# Patient Record
Sex: MALE | Race: WHITE | Employment: UNEMPLOYED | URBAN - NONMETROPOLITAN AREA
[De-identification: names, ages, dates, MRNs, and addresses within clinical notes are randomized per-mention and may not be internally consistent; named-entity substitution may affect disease eponyms.]

---

## 2021-04-14 ENCOUNTER — OFFICE VISIT (OUTPATIENT)
Dept: PRIMARY CARE CLINIC | Age: 21
End: 2021-04-14

## 2021-04-14 ENCOUNTER — HOSPITAL ENCOUNTER (OUTPATIENT)
Age: 21
Setting detail: SPECIMEN
Discharge: HOME | End: 2021-04-14

## 2021-04-14 VITALS
HEART RATE: 124 BPM | RESPIRATION RATE: 16 BRPM | SYSTOLIC BLOOD PRESSURE: 105 MMHG | WEIGHT: 239.4 LBS | TEMPERATURE: 99.3 F | DIASTOLIC BLOOD PRESSURE: 79 MMHG | HEIGHT: 71 IN | BODY MASS INDEX: 33.52 KG/M2 | OXYGEN SATURATION: 95 %

## 2021-04-14 DIAGNOSIS — J06.9 UPPER RESPIRATORY TRACT INFECTION, UNSPECIFIED TYPE: Primary | ICD-10-CM

## 2021-04-14 DIAGNOSIS — Z20.822 PERSON UNDER INVESTIGATION FOR COVID-19: ICD-10-CM

## 2021-04-14 DIAGNOSIS — J06.9 UPPER RESPIRATORY TRACT INFECTION, UNSPECIFIED TYPE: ICD-10-CM

## 2021-04-14 PROCEDURE — 99213 OFFICE O/P EST LOW 20 MIN: CPT | Performed by: NURSE PRACTITIONER

## 2021-04-14 PROCEDURE — U0005 INFEC AGEN DETEC AMPLI PROBE: HCPCS

## 2021-04-14 PROCEDURE — U0003 INFECTIOUS AGENT DETECTION BY NUCLEIC ACID (DNA OR RNA); SEVERE ACUTE RESPIRATORY SYNDROME CORONAVIRUS 2 (SARS-COV-2) (CORONAVIRUS DISEASE [COVID-19]), AMPLIFIED PROBE TECHNIQUE, MAKING USE OF HIGH THROUGHPUT TECHNOLOGIES AS DESCRIBED BY CMS-2020-01-R: HCPCS

## 2021-04-14 RX ORDER — POTASSIUM CHLORIDE 20 MEQ/1
20 TABLET, EXTENDED RELEASE ORAL DAILY
COMMUNITY
Start: 2021-01-22

## 2021-04-14 RX ORDER — OMEPRAZOLE 20 MG/1
20 CAPSULE, DELAYED RELEASE ORAL DAILY
COMMUNITY
Start: 2021-04-08 | End: 2021-05-08

## 2021-04-14 RX ORDER — PROCHLORPERAZINE MALEATE 10 MG
10 TABLET ORAL EVERY 6 HOURS PRN
COMMUNITY
Start: 2021-03-02 | End: 2022-03-02

## 2021-04-14 RX ORDER — OXYCODONE HYDROCHLORIDE 10 MG/1
10 TABLET ORAL EVERY 4 HOURS PRN
COMMUNITY
Start: 2021-04-08 | End: 2021-05-08

## 2021-04-14 SDOH — HEALTH STABILITY: MENTAL HEALTH: HOW OFTEN DO YOU HAVE A DRINK CONTAINING ALCOHOL?: NEVER

## 2021-04-14 ASSESSMENT — ENCOUNTER SYMPTOMS
COUGH: 1
CHEST TIGHTNESS: 0
SORE THROAT: 1
RHINORRHEA: 1
SHORTNESS OF BREATH: 0
GASTROINTESTINAL NEGATIVE: 1
WHEEZING: 0

## 2021-04-14 ASSESSMENT — PATIENT HEALTH QUESTIONNAIRE - PHQ9
1. LITTLE INTEREST OR PLEASURE IN DOING THINGS: 0
2. FEELING DOWN, DEPRESSED OR HOPELESS: 0
SUM OF ALL RESPONSES TO PHQ QUESTIONS 1-9: 0
SUM OF ALL RESPONSES TO PHQ QUESTIONS 1-9: 0

## 2021-04-14 NOTE — PROGRESS NOTES
04/14/21 1652   BP: 105/79   Site: Right Upper Arm   Position: Sitting   Cuff Size: Large Adult   Pulse: 124   Resp: 16   Temp: 99.3 °F (37.4 °C)   TempSrc: Temporal   SpO2: 95%   Weight: 239 lb 6.4 oz (108.6 kg)   Height: 5' 11\" (1.803 m)     Body mass index is 33.39 kg/m². Review of Systems   Constitutional: Negative. Negative for appetite change, chills, fatigue and fever. HENT: Positive for congestion, postnasal drip, rhinorrhea and sore throat. Negative for ear pain. Respiratory: Positive for cough. Negative for chest tightness, shortness of breath and wheezing. Cardiovascular: Negative. Negative for chest pain. Gastrointestinal: Negative. Musculoskeletal: Negative for myalgias. Skin: Negative for rash. Allergic/Immunologic: Positive for environmental allergies. Neurological: Negative for headaches. Physical Exam  Vitals signs and nursing note reviewed. Constitutional:       Appearance: He is well-developed. HENT:      Head: Normocephalic. Jaw: There is normal jaw occlusion. Right Ear: Tympanic membrane, ear canal and external ear normal.      Left Ear: Tympanic membrane, ear canal and external ear normal.      Nose: Congestion and rhinorrhea present. Rhinorrhea is clear. Right Turbinates: Swollen (erythema). Left Turbinates: Swollen (erythema). Right Sinus: No maxillary sinus tenderness or frontal sinus tenderness. Left Sinus: No maxillary sinus tenderness or frontal sinus tenderness. Mouth/Throat:      Lips: Pink. Mouth: Mucous membranes are moist.      Pharynx: Oropharynx is clear. Uvula midline. Posterior oropharyngeal erythema present. Tonsils: 1+ on the right. 1+ on the left. Eyes:      Pupils: Pupils are equal, round, and reactive to light. Neck:      Musculoskeletal: Normal range of motion and neck supple. Cardiovascular:      Rate and Rhythm: Normal rate and regular rhythm. Heart sounds: Normal heart sounds. Pulmonary:      Effort: Pulmonary effort is normal.      Breath sounds: Normal breath sounds and air entry. Lymphadenopathy:      Cervical: No cervical adenopathy. Skin:     General: Skin is warm and dry. Neurological:      General: No focal deficit present. Mental Status: He is alert and oriented to person, place, and time. Psychiatric:         Behavior: Behavior normal.         Thought Content: Thought content normal.       Assessment and Plan:    No results found for this visit on 04/14/21. Diagnosis Orders   1. Upper respiratory tract infection, unspecified type  COVID-19   2. Person under investigation for COVID-19  COVID-19       Self quarantine until negative Covid-19 test result received and symptoms improving. We will call with Covid-19 test results. I recommended that he use mucinex to help with congestion and cough. I also recommended Flonase and an antihistamine for sinus symptoms. he was also encouraged to use tylenol or ibuprofen for pain/fever. Increase water intake. Use cool mist humidifier at bedtime. Use nasal saline flush as needed. Good hand hygiene. he was instructed to return if there is no improvement or symptoms worsen. The use, risks, benefits, and side effects of prescribed or recommended medications were discussed. All questions were answered and the patient/caregiver voiced understanding. No orders of the defined types were placed in this encounter.         Electronically signed by RUIZ Perez CNP on 4/14/21 at 5:28 PM EDT

## 2021-04-14 NOTE — PATIENT INSTRUCTIONS
ventilator. Some people may be placed on their belly to help their oxygen levels. Treatments that may help people who have COVID-19 include:  Antiviral medicines. These medicines treat viral infections. Remdesivir is an example. Immune-based therapy. These medicines help the immune system fight COVID-19. One example is bamlanivimab. It's a monoclonal antibody. Blood thinners. These medicines help prevent blood clots. People with severe illness are at risk for blood clots. How can you protect yourself and others? The best way to protect yourself from getting sick is to:  · Avoid areas where there is an outbreak. · Avoid contact with people who may be infected. · Avoid crowds and try to stay at least 6 feet away from other people. · Wash your hands often, especially after you cough or sneeze. Use soap and water, and scrub for at least 20 seconds. If soap and water aren't available, use an alcohol-based hand . · Avoid touching your mouth, nose, and eyes. To help avoid spreading the virus to others:  · Stay home if you are sick or have been exposed to the virus. Don't go to school, work, or public areas. And don't use public transportation, ride-shares, or taxis unless you have no choice. · Wear a cloth face cover if you have to go to public areas. · Cover your mouth with a tissue when you cough or sneeze. Then throw the tissue in the trash and wash your hands right away. · If you're sick:  ? Leave your home only if you need to get medical care. But call the doctor's office first so they know you're coming. And wear a face cover. ? Wear the face cover whenever you're around other people. It can help stop the spread of the virus. ? Limit contact with pets and people in your home. If possible, stay in a separate bedroom and use a separate bathroom. ? Clean and disinfect your home every day. Use household  and disinfectant wipes or sprays.  Take special care to clean things that you grab with your hands. These include doorknobs, remote controls, phones, and handles on your refrigerator and microwave. And don't forget countertops, tabletops, bathrooms, and computer keyboards. When should you call for help? Call 911 anytime you think you may need emergency care. For example, call if you have life-threatening symptoms, such as:    · You have severe trouble breathing. (You can't talk at all.)     · You have constant chest pain or pressure.     · You are severely dizzy or lightheaded.     · You are confused or can't think clearly.     · Your face and lips have a blue color.     · You pass out (lose consciousness) or are very hard to wake up. Call your doctor now or seek immediate medical care if:    · You have moderate trouble breathing. (You can't speak a full sentence.)     · You are coughing up blood (more than about 1 teaspoon).     · You have signs of low blood pressure. These include feeling lightheaded; being too weak to stand; and having cold, pale, clammy skin. Watch closely for changes in your health, and be sure to contact your doctor if:    · Your symptoms get worse.     · You are not getting better as expected. Call before you go to the doctor's office. Follow their instructions. And wear a cloth face cover. Current as of: February 19, 2021               Content Version: 12.8  © 2006-2021 [employer]. Care instructions adapted under license by [employer]. If you have questions about a medical condition or this instruction, always ask your healthcare professional. [place] [address] any warranty or liability for your use of this information. Patient Education        Coronavirus (FQSXM-91): Care Instructions  Overview  The coronavirus disease (COVID-19) is caused by a virus. Symptoms may include a fever, a cough, and shortness of breath. It mainly spreads person-to-person through droplets from coughing and sneezing.  The virus also can spread when people are in close contact with someone who is infected. Most people have mild symptoms and can take care of themselves at home. If their symptoms get worse, they may need care in a hospital. Treatment may include medicines to reduce symptoms, plus breathing support such as oxygen therapy or a ventilator. It's important to not spread the virus to others. If you have COVID-19, wear a face cover anytime you are around other people. It can help stop the spread of the virus. You need to isolate yourself while you are sick. Leave your home only if you need to get medical care or testing. Follow-up care is a key part of your treatment and safety. Be sure to make and go to all appointments, and call your doctor if you are having problems. It's also a good idea to know your test results and keep a list of the medicines you take. How can you care for yourself at home? · Get extra rest. It can help you feel better. · Drink plenty of fluids. This helps replace fluids lost from fever. Fluids also help ease a scratchy throat. Water, soup, fruit juice, and hot tea with lemon are good choices. · Take acetaminophen (such as Tylenol) to reduce a fever. It may also help with muscle aches. Read and follow all instructions on the label. · Use petroleum jelly on sore skin. This can help if the skin around your nose and lips becomes sore from rubbing a lot with tissues. If you use oxygen, use a water-based product instead of petroleum jelly. Tips for self-isolation  · Limit contact with people in your home. If possible, stay in a separate bedroom and use a separate bathroom. · Wear a cloth face cover when you are around other people. It can help stop the spread of the virus when you cough or sneeze. · If you have to leave home, avoid crowds and try to stay at least 6 feet away from other people. · Avoid contact with pets and other animals. · Cover your mouth and nose with a tissue when you cough or sneeze.  Then throw it in the trash right away. · Wash your hands often, especially after you cough or sneeze. Use soap and water, and scrub for at least 20 seconds. If soap and water aren't available, use an alcohol-based hand . · Don't share personal household items. These include bedding, towels, cups and glasses, and eating utensils. · [address] in the warmest water allowed for the fabric type, and dry it completely. It's okay to wash other people's laundry with yours. · Clean and disinfect your home every day. Use household  and disinfectant wipes or sprays. Take special care to clean things that you grab with your hands. These include doorknobs, remote controls, phones, and handles on your refrigerator and microwave. And don't forget countertops, tabletops, bathrooms, and computer keyboards. When you can end self-isolation  · If you know or suspect that you have COVID-19, stay in self-isolation until:  ? You haven't had a fever for 24 hours while not taking medicines to lower the fever, and  ? Your symptoms have improved, and  ? It's been at least 10 days since your symptoms started. · Talk to your doctor about whether you also need testing, especially if you have a weakened immune system. When should you call for help? Call 911 anytime you think you may need emergency care. For example, call if you have life-threatening symptoms, such as:    · You have severe trouble breathing. (You can't talk at all.)     · You have constant chest pain or pressure.     · You are severely dizzy or lightheaded.     · You are confused or can't think clearly.     · Your face and lips have a blue color.     · You pass out (lose consciousness) or are very hard to wake up. Call your doctor now or seek immediate medical care if:    · You have moderate trouble breathing. (You can't speak a full sentence.)     · You are coughing up blood (more than about 1 teaspoon).     · You have signs of low blood pressure.  These include feeling lightheaded; being too weak to stand; and having cold, pale, clammy skin. Watch closely for changes in your health, and be sure to contact your doctor if:    · Your symptoms get worse.     · You are not getting better as expected. Call before you go to the doctor's office. Follow their instructions. And wear a cloth face cover. Current as of: February 19, 2021               Content Version: 12.8  © 2006-2021 [employer]. Care instructions adapted under license by [facility] ([facility]). If you have questions about a medical condition or this instruction, always ask your healthcare professional. [place] [address] any warranty or liability for your use of this information. Patient Education        Viral Respiratory Infection: Care Instructions  Your Care Instructions     Viruses are very small organisms. They grow in number after they enter your body. There are many types that cause different illnesses, such as colds and the mumps. The symptoms of a viral respiratory infection often start quickly. They include a fever, sore throat, and runny nose. You may also just not feel well. Or you may not want to eat much. Most viral respiratory infections are not serious. They usually get better with time and self-care. Antibiotics are not used to treat a viral infection. That's because antibiotics will not help cure a viral illness. In some cases, antiviral medicine can help your body fight a serious viral infection. Follow-up care is a key part of your treatment and safety. Be sure to make and go to all appointments, and call your doctor if you are having problems. It's also a good idea to know your test results and keep a list of the medicines you take. How can you care for yourself at home? · Rest as much as possible until you feel better. · Be safe with medicines. Take your medicine exactly as prescribed.  Call your doctor if you think you are having a problem with your medicine. You will get more details on the specific medicine your doctor prescribes. · Take an over-the-counter pain medicine, such as acetaminophen (Tylenol), ibuprofen (Advil, Motrin), or naproxen (Aleve), as needed for pain and fever. Read and follow all instructions on the label. Do not give aspirin to anyone younger than 20. It has been linked to Reye syndrome, a serious illness. · Drink plenty of fluids. Hot fluids, such as tea or soup, may help relieve congestion in your nose and throat. If you have kidney, heart, or liver disease and have to limit fluids, talk with your doctor before you increase the amount of fluids you drink. · Try to clear mucus from your lungs by breathing deeply and coughing. · Gargle with warm salt water once an hour. This can help reduce swelling and throat pain. Use 1 teaspoon of salt mixed in 1 cup of warm water. · Do not smoke or allow others to smoke around you. If you need help quitting, talk to your doctor about stop-smoking programs and medicines. These can increase your chances of quitting for good. To avoid spreading the virus  · Cough or sneeze into a tissue. Then throw the tissue away. · If you don't have a tissue, use your hand to cover your cough or sneeze. Then clean your hand. You can also cough into your sleeve. · Wash your hands often. Use soap and warm water. Wash for 15 to 20 seconds each time. · If you don't have soap and water near you, you can clean your hands with alcohol wipes or gel. When should you call for help? Call your doctor now or seek immediate medical care if:    · You have a new or higher fever.     · Your fever lasts more than 48 hours.     · You have trouble breathing.     · You have a fever with a stiff neck or a severe headache.     · You are sensitive to light.     · You feel very sleepy or confused. Watch closely for changes in your health, and be sure to contact your doctor if:    · You do not get better as expected.    Where can you learn more? Go to [email or web address] org and sign in to your [employer] account. Enter R079 in the [employer] box to learn more about \"Viral Respiratory Infection: Care Instructions. \"     If you do not have an account, please click on the \"Sign Up Now\" link. Current as of: October 26, 2020               Content Version: 12.8  © 2006-2021 Healthwise, [facility]. Care instructions adapted under license by [facility] ([facility]). If you have questions about a medical condition or this instruction, always ask your healthcare professional. [place] [address] any warranty or liability for your use of this information. Will notify you of COVID test results as soon as available. You should isoloate at home in an area away from family. If you must be around family members, please wear a mask. Quarantine at home until result is available. This means do not go to work/school, attend family gatherings, or invite others to your home until you know your test results.

## 2021-04-16 LAB
SARS-COV-2: NORMAL
SARS-COV-2: NOT DETECTED
SOURCE: NORMAL

## 2021-04-17 ENCOUNTER — TELEPHONE (OUTPATIENT)
Dept: PRIMARY CARE CLINIC | Age: 21
End: 2021-04-17

## 2021-04-23 ENCOUNTER — HOSPITAL ENCOUNTER (EMERGENCY)
Age: 21
Discharge: HOME | End: 2021-04-23
Attending: EMERGENCY MEDICINE

## 2021-04-23 ENCOUNTER — APPOINTMENT (OUTPATIENT)
Dept: CT IMAGING | Age: 21
End: 2021-04-23

## 2021-04-23 VITALS
RESPIRATION RATE: 16 BRPM | DIASTOLIC BLOOD PRESSURE: 83 MMHG | BODY MASS INDEX: 32.2 KG/M2 | HEART RATE: 80 BPM | TEMPERATURE: 97.1 F | HEIGHT: 71 IN | SYSTOLIC BLOOD PRESSURE: 132 MMHG | WEIGHT: 230 LBS

## 2021-04-23 DIAGNOSIS — R10.9 ABDOMINAL PAIN, UNSPECIFIED ABDOMINAL LOCATION: Primary | ICD-10-CM

## 2021-04-23 LAB
ABSOLUTE EOS #: <0.03 K/UL (ref 0–0.44)
ABSOLUTE IMMATURE GRANULOCYTE: <0.03 K/UL (ref 0–0.3)
ABSOLUTE LYMPH #: 0.81 K/UL (ref 1.1–3.7)
ABSOLUTE MONO #: 0.9 K/UL (ref 0.1–1.4)
ALBUMIN SERPL-MCNC: 4.1 G/DL (ref 3.5–5.2)
ALBUMIN/GLOBULIN RATIO: 1.2 (ref 1–2.5)
ALP BLD-CCNC: 93 U/L (ref 40–129)
ALT SERPL-CCNC: 133 U/L (ref 5–41)
AMYLASE: 39 U/L (ref 28–100)
ANION GAP SERPL CALCULATED.3IONS-SCNC: 14 MMOL/L (ref 9–17)
AST SERPL-CCNC: 67 U/L
BASOPHILS # BLD: 1 % (ref 0–2)
BASOPHILS ABSOLUTE: <0.03 K/UL (ref 0–0.2)
BILIRUB SERPL-MCNC: 0.48 MG/DL (ref 0.3–1.2)
BILIRUBIN DIRECT: 0.19 MG/DL
BILIRUBIN, INDIRECT: 0.29 MG/DL (ref 0–1)
BUN BLDV-MCNC: 10 MG/DL (ref 6–20)
BUN/CREAT BLD: 14 (ref 9–20)
CALCIUM SERPL-MCNC: 9.6 MG/DL (ref 8.6–10.4)
CHLORIDE BLD-SCNC: 96 MMOL/L (ref 98–107)
CO2: 25 MMOL/L (ref 20–31)
CREAT SERPL-MCNC: 0.69 MG/DL (ref 0.7–1.2)
DIFFERENTIAL TYPE: ABNORMAL
EOSINOPHILS RELATIVE PERCENT: 0 % (ref 1–4)
GFR AFRICAN AMERICAN: >60 ML/MIN
GFR NON-AFRICAN AMERICAN: >60 ML/MIN
GFR SERPL CREATININE-BSD FRML MDRD: ABNORMAL ML/MIN/{1.73_M2}
GFR SERPL CREATININE-BSD FRML MDRD: ABNORMAL ML/MIN/{1.73_M2}
GLOBULIN: 3.3 G/DL (ref 1.5–3.8)
GLUCOSE BLD-MCNC: 108 MG/DL (ref 70–99)
HCT VFR BLD CALC: 42.2 % (ref 40.7–50.3)
HEMOGLOBIN: 13.8 G/DL (ref 13–17)
IMMATURE GRANULOCYTES: 1 %
LIPASE: 79 U/L (ref 13–60)
LYMPHOCYTES # BLD: 29 % (ref 25–45)
MCH RBC QN AUTO: 27.5 PG (ref 25.2–33.5)
MCHC RBC AUTO-ENTMCNC: 32.7 G/DL (ref 25.2–33.5)
MCV RBC AUTO: 84.2 FL (ref 82.6–102.9)
MONOCYTES # BLD: 33 % (ref 2–8)
NRBC AUTOMATED: 0 PER 100 WBC
PDW BLD-RTO: 13.9 % (ref 11.8–14.4)
PLATELET # BLD: 302 K/UL (ref 138–453)
PLATELET ESTIMATE: ABNORMAL
PMV BLD AUTO: 10.4 FL (ref 8.1–13.5)
POTASSIUM SERPL-SCNC: 3.4 MMOL/L (ref 3.7–5.3)
RBC # BLD: 5.01 M/UL (ref 4.21–5.77)
RBC # BLD: ABNORMAL 10*6/UL
SEG NEUTROPHILS: 36 % (ref 34–64)
SEGMENTED NEUTROPHILS ABSOLUTE COUNT: 1 K/UL (ref 1.5–8.1)
SODIUM BLD-SCNC: 135 MMOL/L (ref 135–144)
TOTAL PROTEIN: 7.4 G/DL (ref 6.4–8.3)
WBC # BLD: 2.8 K/UL (ref 4.5–13.5)
WBC # BLD: ABNORMAL 10*3/UL

## 2021-04-23 PROCEDURE — 82150 ASSAY OF AMYLASE: CPT

## 2021-04-23 PROCEDURE — 99284 EMERGENCY DEPT VISIT MOD MDM: CPT

## 2021-04-23 PROCEDURE — 83690 ASSAY OF LIPASE: CPT

## 2021-04-23 PROCEDURE — 6360000002 HC RX W HCPCS: Performed by: EMERGENCY MEDICINE

## 2021-04-23 PROCEDURE — 80048 BASIC METABOLIC PNL TOTAL CA: CPT

## 2021-04-23 PROCEDURE — 96375 TX/PRO/DX INJ NEW DRUG ADDON: CPT

## 2021-04-23 PROCEDURE — 80076 HEPATIC FUNCTION PANEL: CPT

## 2021-04-23 PROCEDURE — 96374 THER/PROPH/DIAG INJ IV PUSH: CPT

## 2021-04-23 PROCEDURE — 85025 COMPLETE CBC W/AUTO DIFF WBC: CPT

## 2021-04-23 PROCEDURE — 2709999900 CT ABDOMEN PELVIS W IV CONTRAST

## 2021-04-23 PROCEDURE — 6360000004 HC RX CONTRAST MEDICATION: Performed by: EMERGENCY MEDICINE

## 2021-04-23 RX ORDER — KETOROLAC TROMETHAMINE 30 MG/ML
15 INJECTION, SOLUTION INTRAMUSCULAR; INTRAVENOUS ONCE
Status: COMPLETED | OUTPATIENT
Start: 2021-04-23 | End: 2021-04-23

## 2021-04-23 RX ORDER — MORPHINE SULFATE 4 MG/ML
4 INJECTION, SOLUTION INTRAMUSCULAR; INTRAVENOUS ONCE
Status: COMPLETED | OUTPATIENT
Start: 2021-04-23 | End: 2021-04-23

## 2021-04-23 RX ADMIN — MORPHINE SULFATE 4 MG: 4 INJECTION, SOLUTION INTRAMUSCULAR; INTRAVENOUS at 15:19

## 2021-04-23 RX ADMIN — IOPAMIDOL 100 ML: 755 INJECTION, SOLUTION INTRAVENOUS at 14:10

## 2021-04-23 RX ADMIN — KETOROLAC TROMETHAMINE 15 MG: 30 INJECTION, SOLUTION INTRAMUSCULAR; INTRAVENOUS at 13:24

## 2021-04-23 ASSESSMENT — PAIN DESCRIPTION - DESCRIPTORS
DESCRIPTORS: CRAMPING;SHARP
DESCRIPTORS: CRAMPING;SHARP

## 2021-04-23 ASSESSMENT — PAIN DESCRIPTION - PAIN TYPE
TYPE: CHRONIC PAIN
TYPE: CHRONIC PAIN

## 2021-04-23 ASSESSMENT — PAIN DESCRIPTION - FREQUENCY
FREQUENCY: CONTINUOUS
FREQUENCY: CONTINUOUS

## 2021-04-23 ASSESSMENT — PAIN SCALES - GENERAL
PAINLEVEL_OUTOF10: 7
PAINLEVEL_OUTOF10: 7

## 2021-04-23 ASSESSMENT — PAIN DESCRIPTION - LOCATION: LOCATION: ABDOMEN

## 2021-04-23 NOTE — ED PROVIDER NOTES
[facility] ED  [address]  [place] [address]  Phone: [phone]  [place]      Pt Name: Rodri Higginbotham  MRN: [id number]  [place] 2000  Date of evaluation: 4/23/2021    CHIEF COMPLAINT       Chief Complaint   Patient presents with    Abdominal Pain       HISTORY OF PRESENT ILLNESS    Rodri Higginbotham is a 24 y.o. male who presents to the emergency [facility] complaining of diffuse abdominal pain for the past year. Waxes and wanes in intensity currently 7 out of 10 describes it as a sharp and cramping sensation. Denies any diarrhea or constipation no fevers or chills no nausea or vomiting. He has had this pain and it has been evaluated by GI specialist and his family doctor. He was diagnosed with lymphoma about 2 years ago and he receives chemotherapy once a month at [facility].  He says that he is told pain is associated with that. Today the pain was just too intense for him. REVIEW OF SYSTEMS       Constitutional: No fevers or chills   HEENT: No sore throat, rhinorrhea, or earache   Eyes: No blurry vision or double vision no drainage   Cardiovascular: No chest pain or tachycardia   Respiratory: No wheezing or shortness of breath no cough   Gastrointestinal: Nausea vomiting diarrhea or constipation positive abdominal pain  : No hematuria or dysuria   Musculoskeletal: No swelling or pain   Skin: No rash   Neurological: No focal neurologic complaints, paresthesias, weakness, or headache     PAST MEDICAL HISTORY    has no past medical history on file. SURGICAL HISTORY      has no past surgical history on file. CURRENT MEDICATIONS       Previous Medications    OMEPRAZOLE (PRILOSEC) 20 MG DELAYED RELEASE CAPSULE    Take 20 mg by mouth daily    OXYCODONE HCL (OXY-IR) 10 MG IMMEDIATE RELEASE TABLET    Take 10 mg by mouth every 4 hours as needed.     POTASSIUM CHLORIDE (KLOR-CON M) 20 MEQ EXTENDED RELEASE TABLET    Take 20 mEq by mouth daily    PROCHLORPERAZINE 33.5 g/dL    RDW 13.9 11.8 - 14.4 %    Platelets 527 946 - 957 k/uL    MPV 10.4 8.1 - 13.5 fL    NRBC Automated 0.0 0.0 per 100 WBC    Differential Type NOT REPORTED     Seg Neutrophils 36 34 - 64 %    Lymphocytes 29 25 - 45 %    Monocytes 33 (H) 2 - 8 %    Eosinophils % 0 (L) 1 - 4 %    Basophils 1 0 - 2 %    Immature Granulocytes 1 (H) 0 %    Segs Absolute 1.00 (L) 1.50 - 8.10 k/uL    Absolute Lymph # 0.81 (L) 1.10 - 3.70 k/uL    Absolute Mono # 0.90 0.10 - 1.40 k/uL    Absolute Eos # <0.03 0.00 - 0.44 k/uL    Basophils Absolute <0.03 0.00 - 0.20 k/uL    Absolute Immature Granulocyte <0.03 0.00 - 0.30 k/uL    WBC Morphology NOT REPORTED     RBC Morphology NOT REPORTED     Platelet Estimate NOT REPORTED    Basic Metabolic Panel   Result Value Ref Range    Glucose 108 (H) 70 - 99 mg/dL    BUN 10 6 - 20 mg/dL    CREATININE 0.69 (L) 0.70 - 1.20 mg/dL    Bun/Cre Ratio 14 9 - 20    Calcium 9.6 8.6 - 10.4 mg/dL    Sodium 135 135 - 144 mmol/L    Potassium 3.4 (L) 3.7 - 5.3 mmol/L    Chloride 96 (L) 98 - 107 mmol/L    CO2 25 20 - 31 mmol/L    Anion Gap 14 9 - 17 mmol/L    GFR Non-African American >60 >60 mL/min    GFR African American >60 >60 mL/min    GFR Comment          GFR Staging NOT REPORTED    Amylase   Result Value Ref Range    Amylase 39 28 - 100 U/L   Lipase   Result Value Ref Range    Lipase 79 (H) 13 - 60 U/L   Hepatic Function Panel   Result Value Ref Range    Albumin 4.1 3.5 - 5.2 g/dL    Alkaline Phosphatase 93 40 - 129 U/L     (H) 5 - 41 U/L    AST 67 (H) <40 U/L    Total Bilirubin 0.48 0.3 - 1.2 mg/dL    Bilirubin, Direct 0.19 <0.31 mg/dL    Bilirubin, Indirect 0.29 0.00 - 1.00 mg/dL    Total Protein 7.4 6.4 - 8.3 g/dL    Globulin 3.3 1.5 - 3.8 g/dL    Albumin/Globulin Ratio 1.2 1.0 - 2.5       Not indicated unless otherwise documented above    RADIOLOGY:   I reviewed the radiologist interpretations:    CT ABDOMEN PELVIS W IV CONTRAST Additional Contrast? None   Final Result   Hepatic fatty infiltration, otherwise negative CT examination of the abdomen   and pelvis with no evidence of acute process including normal appendix. Not indicated unless otherwise documented above    EMERGENCY DEPARTMENT COURSE:     The patient was given the following medications:  Orders Placed This Encounter   Medications    ketorolac (TORADOL) injection 15 mg    iopamidol (ISOVUE-370) 76 % injection 100 mL    morphine injection 4 mg        Vitals:   -------------------------  /83   Pulse 80   Temp 97.1 °F (36.2 °C)   Resp 16   Ht 5' 11\" (1.803 m)   Wt 230 lb (104.3 kg)   BMI 32.08 kg/m²     3 PM labs unremarkable slightly low white blood cell count. CT the abdomen pelvis negative for acute intra-abdominal pathology. Acute exacerbation of chronic abdominal pain. Patient tearful complaining of abdominal pain. Toradol did not help much. We will give him 1 dose of morphine and discharged home to follow-up with family physician or GI specialist.  Abdomen is soft nonsurgical.      The patient understands that at this time there is no evidence for a more malignant underlying process, but also understands that early in the process of an illness or injury, an emergency department workup can be falsely reassuring. Routine discharge counseling was given, and it is understood that worsening, changing or persistent symptoms should prompt an immediate call or follow up with their primary physician or return to the emergency department. The importance of appropriate follow up was also discussed. I have reviewed the disposition diagnosis. I have answered the questions and given discharge instructions. There was voiced understanding of these instructions and no further questions or complaints. CRITICAL CARE:    None    CONSULTS:    None    PROCEDURES:    None      OARRS Report if indicated             FINAL IMPRESSION      1.  Abdominal pain, unspecified abdominal location          DISPOSITION/PLAN DISPOSITION Discharge - Pending Orders Complete 04/23/2021 03:00:22 PM        CONDITION ON DISPOSITION: STABLE       PATIENT REFERRED TO:  Family Physician    Schedule an appointment as soon as possible for a visit in 3 days        DISCHARGE MEDICATIONS:  New Prescriptions    No medications on file       (Please note that portions of thisnote were completed with a voice recognition program.  Efforts were made to edit the dictations but occasionally words are mis-transcribed.)    Rebeca Schirmer DO  Attending Emergency Physician     Rebeca Schirmer, DO  04/23/21 1267

## 2024-06-18 ENCOUNTER — OFFICE VISIT (OUTPATIENT)
Dept: PRIMARY CARE CLINIC | Age: 24
End: 2024-06-18
Payer: COMMERCIAL

## 2024-06-18 VITALS
BODY MASS INDEX: 17.99 KG/M2 | WEIGHT: 128.5 LBS | TEMPERATURE: 96.9 F | HEIGHT: 71 IN | OXYGEN SATURATION: 99 % | SYSTOLIC BLOOD PRESSURE: 118 MMHG | HEART RATE: 97 BPM | DIASTOLIC BLOOD PRESSURE: 78 MMHG | RESPIRATION RATE: 15 BRPM

## 2024-06-18 DIAGNOSIS — B34.9 VIRAL ILLNESS: ICD-10-CM

## 2024-06-18 DIAGNOSIS — B34.9 VIRAL ILLNESS: Primary | ICD-10-CM

## 2024-06-18 PROCEDURE — 99213 OFFICE O/P EST LOW 20 MIN: CPT

## 2024-06-18 RX ORDER — FLUTICASONE PROPIONATE 50 MCG
1 SPRAY, SUSPENSION (ML) NASAL DAILY
Qty: 16 G | Refills: 0 | Status: SHIPPED | OUTPATIENT
Start: 2024-06-18

## 2024-06-18 RX ORDER — FLUTICASONE PROPIONATE 50 MCG
1 SPRAY, SUSPENSION (ML) NASAL DAILY
Qty: 32 G | OUTPATIENT
Start: 2024-06-18

## 2024-06-18 ASSESSMENT — ENCOUNTER SYMPTOMS
NAUSEA: 0
SINUS PAIN: 0
DIARRHEA: 0
EYES NEGATIVE: 1
ALLERGIC/IMMUNOLOGIC NEGATIVE: 1
SORE THROAT: 0
CONSTIPATION: 0
SINUS PRESSURE: 0
COUGH: 1
RHINORRHEA: 1
VOMITING: 1

## 2024-06-18 NOTE — PROGRESS NOTES
[facility]  [address]  [facility] [address]  Dept: [phone]  Dept Fax: [phone]    Jamar Walton Jr.  is a 24 y.o. male who presents today for his medical conditions/complaints as noted below.  Jamar Walton Jr. is c/o of   Chief Complaint   Patient presents with    Congestion     Congestions, sore throat, can't hear anything, coughing. Does report post nasal drip        HPI:     URI   This is a new problem. The current episode started in the past 7 days (sx began on Sunday sinus dainage). The problem has been unchanged. There has been no fever. Associated symptoms include congestion, coughing, rhinorrhea and vomiting. Pertinent negatives include no diarrhea, ear pain, nausea, sinus pain, sneezing or sore throat. He has tried antihistamine (cold and flu medication) for the symptoms. The treatment provided mild relief.         History reviewed. No pertinent past medical history.  Past Surgical History:   Procedure Laterality Date    XR MIDLINE EQUAL OR GREATER THAN 5 YEARS  1/13/2020    XR MIDLINE EQUAL OR GREATER THAN 5 YEARS 1/13/2020       History reviewed. No pertinent family history.    Social History     Tobacco Use    Smoking status: Never    Smokeless tobacco: Never   Substance Use Topics    Alcohol use: Not Currently      Prior to Visit Medications    Medication Sig Taking? Authorizing Provider   potassium chloride (KLOR-CON M) 20 MEQ extended release tablet Take 20 mEq by mouth daily  Patient not taking: Reported on 6/18/2024  Allison Shields MD   omeprazole (PRILOSEC) 20 MG delayed release capsule Take 20 mg by mouth daily  Allison Shields MD   prochlorperazine (COMPAZINE) 10 MG tablet Take 10 mg by mouth every 6 hours as needed  Allison Shields MD     Allergies   Allergen Reactions    Clindamycin Hcl Rash    Penicillin V Rash     As a child